# Patient Record
Sex: FEMALE | URBAN - METROPOLITAN AREA
[De-identification: names, ages, dates, MRNs, and addresses within clinical notes are randomized per-mention and may not be internally consistent; named-entity substitution may affect disease eponyms.]

---
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
## 2024-12-11 NOTE — PROCEDURE: COUNSELING
Detail Level: Zone
ADULT PO) Take by mouth      BIOTIN PO Take by mouth      meclizine (ANTIVERT) 12.5 MG tablet Take 1 tablet by mouth 3 times daily as needed for Dizziness 15 tablet 0    mupirocin (BACTROBAN) 2 % ointment Apply topically 3 times daily. 30 g 0    Magnesium 500 MG CAPS Take by mouth      Cholecalciferol (VITAMIN D3) 250 MCG (58413 UT) TABS Take by mouth       No current facility-administered medications for this visit.     PMH, Surgical Hx, Family Hx, and Social Hx reviewed and updated.  Health Maintenance reviewed.    Objective  Vitals:    12/11/24 1326   BP: 136/82   Pulse: 72   Temp: 98.9 °F (37.2 °C)   SpO2: 97%   Weight: 87.1 kg (192 lb)   Height: 1.702 m (5' 7\")     BP Readings from Last 3 Encounters:   12/11/24 136/82   11/27/24 (!) 149/91   09/22/23 136/82     Wt Readings from Last 3 Encounters:   12/11/24 87.1 kg (192 lb)   11/27/24 88.8 kg (195 lb 12.8 oz)   11/16/23 88.9 kg (196 lb)     Physical Exam  Constitutional:       General: She is not in acute distress.     Appearance: Normal appearance.   HENT:      Head: Normocephalic.      Right Ear: Tympanic membrane and ear canal normal.      Left Ear: Ear canal normal. Tympanic membrane is scarred. Tympanic membrane is not erythematous or bulging.      Nose: Nose normal.      Mouth/Throat:      Lips: Pink.      Mouth: Mucous membranes are moist.      Pharynx: Oropharynx is clear. Uvula midline.   Eyes:      Extraocular Movements: Extraocular movements intact.      Conjunctiva/sclera: Conjunctivae normal.      Pupils: Pupils are equal, round, and reactive to light.   Cardiovascular:      Rate and Rhythm: Normal rate and regular rhythm.      Pulses: Normal pulses.      Heart sounds: Normal heart sounds.   Pulmonary:      Effort: Pulmonary effort is normal.      Breath sounds: Normal breath sounds. No wheezing, rhonchi or rales.   Musculoskeletal:         General: Normal range of motion.      Cervical back: Normal range of motion.   Skin:     General: Skin is

## 2025-01-13 ENCOUNTER — OFFICE VISIT (OUTPATIENT)
Dept: URBAN - METROPOLITAN AREA CLINIC 89 | Facility: CLINIC | Age: 25
End: 2025-01-13
Payer: MEDICAID

## 2025-01-13 DIAGNOSIS — H52.223 REGULAR ASTIGMATISM, BILATERAL: ICD-10-CM

## 2025-01-13 DIAGNOSIS — H53.002 AMBLYOPIA OF LEFT EYE: Primary | ICD-10-CM

## 2025-01-13 PROCEDURE — 92004 COMPRE OPH EXAM NEW PT 1/>: CPT | Performed by: OPTOMETRIST

## 2025-01-13 ASSESSMENT — KERATOMETRY
OS: 42.95
OD: 43.20

## 2025-01-13 ASSESSMENT — INTRAOCULAR PRESSURE
OS: 18
OD: 17

## 2025-01-13 ASSESSMENT — VISUAL ACUITY
OS: 20/40
OD: 20/25